# Patient Record
Sex: MALE | Race: WHITE
[De-identification: names, ages, dates, MRNs, and addresses within clinical notes are randomized per-mention and may not be internally consistent; named-entity substitution may affect disease eponyms.]

---

## 2020-09-18 ENCOUNTER — HOSPITAL ENCOUNTER (EMERGENCY)
Dept: HOSPITAL 39 - ER | Age: 70
Discharge: HOME | End: 2020-09-18
Payer: MEDICARE

## 2020-09-18 VITALS — SYSTOLIC BLOOD PRESSURE: 121 MMHG | TEMPERATURE: 97.5 F | DIASTOLIC BLOOD PRESSURE: 77 MMHG | OXYGEN SATURATION: 98 %

## 2020-09-18 DIAGNOSIS — Z79.82: ICD-10-CM

## 2020-09-18 DIAGNOSIS — T83.9XXA: Primary | ICD-10-CM

## 2020-09-18 DIAGNOSIS — Z79.899: ICD-10-CM
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
## 2020-09-18 DIAGNOSIS — Z96.641: ICD-10-CM

## 2020-09-18 NOTE — ED.PDOC
History of Present Illness





- General


Time Seen by Provider: 09/18/20 13:49





- History of Present Illness


Initial Comments: 





71 yo M PMH HTN HL BPH got right hip replacement surgery 8 days ago, presents to

ED hernandez in place unable to see Dr. Means for removal. Denies fever cough sob 

recent travel or contact with covid19. Denies fever chills nausea vomiting 

diarrhea chest pain sob diaphoresis. No change in diet rest or bowel hernandez and 

leg bag in place with clear yellow urine. Denies drinking or smoking admits FH 

DM denies FH HTN has PMD Dr. Osei for follow up. No other c/o today.





PPE worn-N95 surgical mask with attached face shield over N95 goggles gloves and

face shield over that


Allergies/Adverse Reactions: 


Allergies





NO KNOWN ALLERGY Allergy (Verified 09/18/20 14:12)


   





Home Medications: 


Ambulatory Orders





Acetaminophen [Tylenol] 650 mg PO Q6H PRN #30 tab 09/18/20 


Amoxicillin & Pot Clavulanate [Augmentin Tab] 875 mg PO BID 10 Days #20 tab 

09/18/20 


Ascorbic Acid [Vitamin C] 500 mg PO 09/18/20 


Aspirin [Aspirin EC] 81 mg PO 09/18/20 











Review of Systems





- Review of Systems


Constitutional: States: see HPI


EENTM: States: see HPI


Respiratory: States: see HPI


Cardiology: States: see HPI


Gastrointestinal/Abdominal: States: see HPI


Genitourinary: States: see HPI


Musculoskeletal: States: see HPI


Skin: States: see HPI


Neurological: States: see HPI


Endocrine: States: see HPI


All other Systems: Reviewed and Negative





Family Medical History





- Family History


  ** Mother


Family History: Unknown





Physical Exam





- Physical Exam


General Appearance: No apparent distress


Eye Exam: bilateral normal


Ears, Nose, Throat: normal ENT inspection


Neck: non-tender, full range of motion


Respiratory: no respiratory distress


Cardiovascular/Chest: regular rate, rhythm


Gastrointestinal/Abdominal: non tender, soft


Rectal Exam: deferred


Back Exam: normal inspection


Extremity: normal range of motion, non-tender


Neurologic: no motor/sensory deficits


Skin Exam: normal color





Progress





- Progress


Progress: 





09/18/20 13:55


[facility]- call urology to ensure no contra indications to hernandez removal 

ensure patient can void ua to r/o UTI then d/c home


09/18/20 14:41





Spoke to Dr. Bhat [facility] who states place patient on empiric antibiotic remove 

hernandez and strong return to ED precautions if cannot void or pain develops. Pt. 

has sister staying with him will not be home alone and understands these 

instructions





Departure





- Departure


Clinical Impression: 


Hernandez catheter problem


Qualifiers:


 Encounter type: initial encounter Qualified Code(s): T83.9XXA - Unspecified 

complication of genitourinary prosthetic device, implant and graft, initial 

encounter





Time of Disposition: 14:45


Disposition: Discharge to Home or Self Care


Condition: Good


Referrals: 


JOSIE BHAT MD [Referring] - 1-2 Days


Prescriptions: 


Amoxicillin & Pot Clavulanate [Augmentin Tab] 875 mg PO BID 10 Days #20 tab


Acetaminophen [Tylenol] 650 mg PO Q6H PRN #30 tab


 PRN Reason: Pain


Home Medications: 


Ambulatory Orders





Acetaminophen [Tylenol] 650 mg PO Q6H PRN #30 tab 09/18/20 


Amoxicillin & Pot Clavulanate [Augmentin Tab] 875 mg PO BID 10 Days #20 tab 

09/18/20 


Ascorbic Acid [Vitamin C] 500 mg PO 09/18/20 


Aspirin [Aspirin EC] 81 mg PO 09/18/20